# Patient Record
Sex: MALE | Race: WHITE | NOT HISPANIC OR LATINO | URBAN - METROPOLITAN AREA
[De-identification: names, ages, dates, MRNs, and addresses within clinical notes are randomized per-mention and may not be internally consistent; named-entity substitution may affect disease eponyms.]

---

## 2019-03-10 ENCOUNTER — INPATIENT (INPATIENT)
Facility: HOSPITAL | Age: 64
LOS: 1 days | Discharge: HOME | DRG: 287 | End: 2019-03-12
Attending: INTERNAL MEDICINE | Admitting: INTERNAL MEDICINE
Payer: COMMERCIAL

## 2019-03-10 VITALS
OXYGEN SATURATION: 100 % | RESPIRATION RATE: 18 BRPM | DIASTOLIC BLOOD PRESSURE: 73 MMHG | HEART RATE: 78 BPM | SYSTOLIC BLOOD PRESSURE: 119 MMHG | TEMPERATURE: 98 F

## 2019-03-10 LAB
ALBUMIN SERPL ELPH-MCNC: 3.4 G/DL — SIGNIFICANT CHANGE UP (ref 3.4–5)
ALP SERPL-CCNC: 62 U/L — SIGNIFICANT CHANGE UP (ref 40–120)
ALT FLD-CCNC: 26 U/L — SIGNIFICANT CHANGE UP (ref 12–42)
ANION GAP SERPL CALC-SCNC: 10 MMOL/L — SIGNIFICANT CHANGE UP (ref 9–16)
AST SERPL-CCNC: 15 U/L — SIGNIFICANT CHANGE UP (ref 15–37)
BILIRUB SERPL-MCNC: 0.3 MG/DL — SIGNIFICANT CHANGE UP (ref 0.2–1.2)
BUN SERPL-MCNC: 19 MG/DL — SIGNIFICANT CHANGE UP (ref 7–23)
CALCIUM SERPL-MCNC: 8.2 MG/DL — LOW (ref 8.5–10.5)
CHLORIDE SERPL-SCNC: 107 MMOL/L — SIGNIFICANT CHANGE UP (ref 96–108)
CO2 SERPL-SCNC: 26 MMOL/L — SIGNIFICANT CHANGE UP (ref 22–31)
CREAT SERPL-MCNC: 1.22 MG/DL — SIGNIFICANT CHANGE UP (ref 0.5–1.3)
ETHANOL SERPL-MCNC: 47 MG/DL — HIGH
GLUCOSE SERPL-MCNC: 113 MG/DL — HIGH (ref 70–99)
HCT VFR BLD CALC: 41.2 % — SIGNIFICANT CHANGE UP (ref 39–50)
HGB BLD-MCNC: 13.5 G/DL — SIGNIFICANT CHANGE UP (ref 13–17)
LACTATE SERPL-SCNC: 2.5 MMOL/L — HIGH (ref 0.4–2)
MCHC RBC-ENTMCNC: 29.2 PG — SIGNIFICANT CHANGE UP (ref 27–34)
MCHC RBC-ENTMCNC: 32.8 G/DL — SIGNIFICANT CHANGE UP (ref 32–36)
MCV RBC AUTO: 89.2 FL — SIGNIFICANT CHANGE UP (ref 80–100)
PCP SPEC-MCNC: SIGNIFICANT CHANGE UP
PLATELET # BLD AUTO: 189 K/UL — SIGNIFICANT CHANGE UP (ref 150–400)
POTASSIUM SERPL-MCNC: 3.7 MMOL/L — SIGNIFICANT CHANGE UP (ref 3.5–5.3)
POTASSIUM SERPL-SCNC: 3.7 MMOL/L — SIGNIFICANT CHANGE UP (ref 3.5–5.3)
PROT SERPL-MCNC: 6.2 G/DL — LOW (ref 6.4–8.2)
RBC # BLD: 4.62 M/UL — SIGNIFICANT CHANGE UP (ref 4.2–5.8)
RBC # FLD: 12.7 % — SIGNIFICANT CHANGE UP (ref 10.3–14.5)
SODIUM SERPL-SCNC: 143 MMOL/L — SIGNIFICANT CHANGE UP (ref 132–145)
TROPONIN I SERPL-MCNC: 0.03 NG/ML — SIGNIFICANT CHANGE UP (ref 0.02–0.06)
WBC # BLD: 5.7 K/UL — SIGNIFICANT CHANGE UP (ref 3.8–10.5)
WBC # FLD AUTO: 5.7 K/UL — SIGNIFICANT CHANGE UP (ref 3.8–10.5)

## 2019-03-10 PROCEDURE — 93010 ELECTROCARDIOGRAM REPORT: CPT

## 2019-03-10 PROCEDURE — 99285 EMERGENCY DEPT VISIT HI MDM: CPT | Mod: 25

## 2019-03-10 PROCEDURE — 99284 EMERGENCY DEPT VISIT MOD MDM: CPT

## 2019-03-10 RX ORDER — ASPIRIN/CALCIUM CARB/MAGNESIUM 324 MG
325 TABLET ORAL ONCE
Qty: 0 | Refills: 0 | Status: COMPLETED | OUTPATIENT
Start: 2019-03-10 | End: 2019-03-10

## 2019-03-10 RX ORDER — SODIUM CHLORIDE 9 MG/ML
1000 INJECTION INTRAMUSCULAR; INTRAVENOUS; SUBCUTANEOUS ONCE
Qty: 0 | Refills: 0 | Status: COMPLETED | OUTPATIENT
Start: 2019-03-10 | End: 2019-03-10

## 2019-03-10 RX ORDER — FAMOTIDINE 10 MG/ML
20 INJECTION INTRAVENOUS ONCE
Qty: 0 | Refills: 0 | Status: COMPLETED | OUTPATIENT
Start: 2019-03-10 | End: 2019-03-10

## 2019-03-10 RX ORDER — ONDANSETRON 8 MG/1
4 TABLET, FILM COATED ORAL ONCE
Qty: 0 | Refills: 0 | Status: COMPLETED | OUTPATIENT
Start: 2019-03-10 | End: 2019-03-10

## 2019-03-10 RX ADMIN — SODIUM CHLORIDE 2000 MILLILITER(S): 9 INJECTION INTRAMUSCULAR; INTRAVENOUS; SUBCUTANEOUS at 23:49

## 2019-03-10 RX ADMIN — Medication 325 MILLIGRAM(S): at 23:49

## 2019-03-10 RX ADMIN — FAMOTIDINE 20 MILLIGRAM(S): 10 INJECTION INTRAVENOUS at 23:49

## 2019-03-10 RX ADMIN — ONDANSETRON 4 MILLIGRAM(S): 8 TABLET, FILM COATED ORAL at 23:49

## 2019-03-10 NOTE — ED PROVIDER NOTE - ATTENDING CONTRIBUTION TO CARE
63 yom pw near syncope at dinner, states had drinks and marijuana.  reports nausea at the time.  hypotensive in field per EMS.  no sob/pleurisy/dyspnea.  last stress 1.5 yr ago per pt.  hx of HL, active smoker.  no ha, no neck pain.  per wife, similar sx 2 days ago, did not seek medical attention.    agree w/ PA, pt w/ risk factors but history not typical of acs, though initial ekg w/ t wave inversion in v4/5, will rpt labs and serial ekg.

## 2019-03-10 NOTE — ED PROVIDER NOTE - CARE PLAN
Principal Discharge DX:	Syncope Principal Discharge DX:	Syncope  Secondary Diagnosis:	Lactic acidosis

## 2019-03-10 NOTE — ED CDU PROVIDER INITIAL DAY NOTE - ATTENDING CONTRIBUTION TO CARE
pt placed on obs for telemetry monitoring and serial ekg/trop    noted 2nd ekg w/ progression of t wave inversion, will rpt again, pending trop

## 2019-03-10 NOTE — ED ADULT TRIAGE NOTE - CHIEF COMPLAINT QUOTE
patient was drinking and also took multiple "hits of marijuana" from vape pen when he started getting dizzy. patient was hypotensive in field. currently aaox3,

## 2019-03-10 NOTE — ED PROVIDER NOTE - CLINICAL SUMMARY MEDICAL DECISION MAKING FREE TEXT BOX
pt p/w near syncopal episode after dinner and marijuana use today, noted hypotensive in the field but normal vs upon arrival to the ED, EKG with TWI, no active CP, labs with elevated lactic at 2.5, trop 0.03, repeat EKG with no dynamic changes, will keep in obs for serial CE, tele monitoring, EKG, r/o ACS

## 2019-03-10 NOTE — ED CDU PROVIDER INITIAL DAY NOTE - OBJECTIVE STATEMENT
64 yo M with PMHx of HLD, depression, BIBA for dizziness and nausea s/p having alcohol and smoking marijuana tonight. Pt reports having dinner with family at an [place] restaurant, had few alcoholic drinks and multiple "hits of marijuana" from vape pen when he started feeling lightheaded with associated nausea around 10pm. Pt reports Sx got better after EMS arrival and IVF.  Noted to be hypotensive in the field with BP of 77/40s.  Now with residual nausea.  Denies fever, chills, palpitations, diaphoresis, SNOW, SOB, orthopnea, cough, hemoptysis, wheezing, peripheral edema, focal weakness, numbness, tingling, paresthesia, HA, LOC, neck pain, V/D/C, abdominal pain, change in urinary/bowel function, trauma, fall, rash, and malaise. Negative stress test 1.5 yr ago

## 2019-03-10 NOTE — ED PROVIDER NOTE - PHYSICAL EXAMINATION
Vital Signs - nursing notes reviewed and confirmed  Gen - WDWN M, NAD, comfortable and non-toxic appearing, speaking in full sentences   Skin - warm, dry, intact  HEENT - AT/NC, PERRL, EOMI, no conjunctival injection, moist oral mucosa, TM intact b/l with good cone of lights, o/p clear with no erythema, edema, or exudate, uvula midline, airway patent, neck supple and NT, FROM, no JVD or carotid bruits b/l, no palpable nodes  CV - S1S2, R/R/R  Resp - respiration non-labored, CTAB, symmetric bs b/l, no r/r/w  GI - NABS, soft, ND, NT, no rebound or guarding, no CVAT b/l   MS - w/w/p, no c/c/e, calves supple and NT, distal pulses symmetric b/l, brisk cap refills, +SILT  Neuro - AxOx3, no focal neuro deficits, CN II-XII grossly intact, cerebellar function intact, negative pronator drift, negative nystagmus, ambulatory without gait disturbance

## 2019-03-10 NOTE — ED ADULT NURSE NOTE - NSIMPLEMENTINTERV_GEN_ALL_ED
Implemented All Universal Safety Interventions:  [place] to call system. Call bell, personal items and telephone within reach. Instruct patient to call for assistance. Room bathroom lighting operational. Non-slip footwear when patient is off stretcher. Physically safe environment: no spills, clutter or unnecessary equipment. Stretcher in lowest position, wheels locked, appropriate side rails in place.

## 2019-03-10 NOTE — ED PROVIDER NOTE - OBJECTIVE STATEMENT
64 yo M with PMHx of 64 yo M with PMHx of HLD, depression, BIBA for dizziness and nausea s/p having alcohol and smoking marijuana tonight. Pt reports having dinner with family at an [place] restaurant, had few alcoholic drinks and multiple "hits of marijuana" from vape pen when he started feeling lightheaded with associated nausea around 10pm. Pt reports Sx got better after EMS arrival and IVF.  Noted to be hypotensive in the field with BP of 77/40s.  Now with residual nausea.  Denies fever, chills, palpitations, diaphoresis, SONW, SOB, orthopnea, cough, hemoptysis, wheezing, peripheral edema, focal weakness, numbness, tingling, paresthesia, HA, LOC, neck pain, V/D/C, abdominal pain, change in urinary/bowel function, trauma, fall, rash, and malaise. No prior stress test noted 64 yo M with PMHx of HLD, depression, BIBA for dizziness and nausea s/p having alcohol and smoking marijuana tonight. Pt reports having dinner with family at an [place] restaurant, had few alcoholic drinks and multiple "hits of marijuana" from vape pen when he started feeling lightheaded with associated nausea around 10pm. Pt reports Sx got better after EMS arrival and IVF.  Noted to be hypotensive in the field with BP of 77/40s.  Now with residual nausea.  Denies fever, chills, palpitations, diaphoresis, SNOW, SOB, orthopnea, cough, hemoptysis, wheezing, peripheral edema, focal weakness, numbness, tingling, paresthesia, HA, LOC, neck pain, V/D/C, abdominal pain, change in urinary/bowel function, trauma, fall, rash, and malaise. Negative stress test 1.5 yr ago

## 2019-03-10 NOTE — ED CDU PROVIDER INITIAL DAY NOTE - FAMILY HISTORY
Type 2 diabetes mellitus with hyperglycemia, with long-term current use of insulin No pertinent family history in first degree relatives

## 2019-03-10 NOTE — ED CDU PROVIDER INITIAL DAY NOTE - PROGRESS NOTE DETAILS
repeat EKG with no dynamic changes, pt remains CP free, asymptomatic and comfortable.  Will continue tele monitoring, IVF, and serial trops repeat EKG noted slight progression of TWI in V3-6, pt remains CP free, no SOB/palpitations/LOC, repeat trop stable, rec inpt ACS workup given HEART score of 5 and concerning EKG, but pt refused hospitalization and reports no active sx and feeling at baseline, risks and benefits fully discussed with pt and wife at bedside.  Will keep pt in obs for cardiology consult and CCTA in the am, pt amenable for current plan metoprolol given for rate optimization for CCTA. pt also on a statin at home. Will continue tele monitoring, medical management, pending cardiology eval in the am. wife - Olga Tex [phone]  private cardiologist @ [place] [facility]

## 2019-03-11 DIAGNOSIS — R63.8 OTHER SYMPTOMS AND SIGNS CONCERNING FOOD AND FLUID INTAKE: ICD-10-CM

## 2019-03-11 DIAGNOSIS — I10 ESSENTIAL (PRIMARY) HYPERTENSION: ICD-10-CM

## 2019-03-11 DIAGNOSIS — Z91.89 OTHER SPECIFIED PERSONAL RISK FACTORS, NOT ELSEWHERE CLASSIFIED: ICD-10-CM

## 2019-03-11 DIAGNOSIS — Z78.9 OTHER SPECIFIED HEALTH STATUS: ICD-10-CM

## 2019-03-11 DIAGNOSIS — E78.5 HYPERLIPIDEMIA, UNSPECIFIED: ICD-10-CM

## 2019-03-11 DIAGNOSIS — F32.9 MAJOR DEPRESSIVE DISORDER, SINGLE EPISODE, UNSPECIFIED: ICD-10-CM

## 2019-03-11 DIAGNOSIS — Z29.9 ENCOUNTER FOR PROPHYLACTIC MEASURES, UNSPECIFIED: ICD-10-CM

## 2019-03-11 DIAGNOSIS — I25.10 ATHEROSCLEROTIC HEART DISEASE OF NATIVE CORONARY ARTERY WITHOUT ANGINA PECTORIS: ICD-10-CM

## 2019-03-11 LAB
LACTATE SERPL-SCNC: 1.1 MMOL/L — SIGNIFICANT CHANGE UP (ref 0.4–2)
TROPONIN I SERPL-MCNC: 0.03 NG/ML — SIGNIFICANT CHANGE UP (ref 0.02–0.06)
TROPONIN I SERPL-MCNC: 0.04 NG/ML — SIGNIFICANT CHANGE UP (ref 0.02–0.06)

## 2019-03-11 PROCEDURE — 99222 1ST HOSP IP/OBS MODERATE 55: CPT

## 2019-03-11 PROCEDURE — 99236 HOSP IP/OBS SAME DATE HI 85: CPT

## 2019-03-11 PROCEDURE — 75574 CT ANGIO HRT W/3D IMAGE: CPT | Mod: 26

## 2019-03-11 RX ORDER — LANOLIN ALCOHOL/MO/W.PET/CERES
5 CREAM (GRAM) TOPICAL AT BEDTIME
Qty: 0 | Refills: 0 | Status: DISCONTINUED | OUTPATIENT
Start: 2019-03-11 | End: 2019-03-12

## 2019-03-11 RX ORDER — METOPROLOL TARTRATE 50 MG
25 TABLET ORAL DAILY
Qty: 0 | Refills: 0 | Status: DISCONTINUED | OUTPATIENT
Start: 2019-03-11 | End: 2019-03-12

## 2019-03-11 RX ORDER — ATORVASTATIN CALCIUM 80 MG/1
80 TABLET, FILM COATED ORAL AT BEDTIME
Qty: 0 | Refills: 0 | Status: DISCONTINUED | OUTPATIENT
Start: 2019-03-11 | End: 2019-03-12

## 2019-03-11 RX ORDER — HEPARIN SODIUM 5000 [USP'U]/ML
5000 INJECTION INTRAVENOUS; SUBCUTANEOUS EVERY 8 HOURS
Qty: 0 | Refills: 0 | Status: DISCONTINUED | OUTPATIENT
Start: 2019-03-11 | End: 2019-03-12

## 2019-03-11 RX ORDER — ASPIRIN/CALCIUM CARB/MAGNESIUM 324 MG
81 TABLET ORAL DAILY
Qty: 0 | Refills: 0 | Status: DISCONTINUED | OUTPATIENT
Start: 2019-03-11 | End: 2019-03-12

## 2019-03-11 RX ORDER — SERTRALINE 25 MG/1
150 TABLET, FILM COATED ORAL
Qty: 0 | Refills: 0 | COMMUNITY

## 2019-03-11 RX ORDER — LAMOTRIGINE 25 MG/1
150 TABLET, ORALLY DISINTEGRATING ORAL DAILY
Qty: 0 | Refills: 0 | Status: DISCONTINUED | OUTPATIENT
Start: 2019-03-11 | End: 2019-03-12

## 2019-03-11 RX ORDER — LAMOTRIGINE 25 MG/1
1 TABLET, ORALLY DISINTEGRATING ORAL
Qty: 0 | Refills: 0 | COMMUNITY

## 2019-03-11 RX ORDER — NITROGLYCERIN 6.5 MG
0.8 CAPSULE, EXTENDED RELEASE ORAL ONCE
Qty: 0 | Refills: 0 | Status: COMPLETED | OUTPATIENT
Start: 2019-03-11 | End: 2019-03-11

## 2019-03-11 RX ORDER — METOPROLOL TARTRATE 50 MG
50 TABLET ORAL ONCE
Qty: 0 | Refills: 0 | Status: COMPLETED | OUTPATIENT
Start: 2019-03-11 | End: 2019-03-11

## 2019-03-11 RX ORDER — AMLODIPINE BESYLATE AND BENAZEPRIL HYDROCHLORIDE 10; 20 MG/1; MG/1
1 CAPSULE ORAL
Qty: 0 | Refills: 0 | COMMUNITY

## 2019-03-11 RX ORDER — ATORVASTATIN CALCIUM 80 MG/1
80 TABLET, FILM COATED ORAL ONCE
Qty: 0 | Refills: 0 | Status: COMPLETED | OUTPATIENT
Start: 2019-03-11 | End: 2019-03-11

## 2019-03-11 RX ORDER — METOPROLOL TARTRATE 50 MG
25 TABLET ORAL ONCE
Qty: 0 | Refills: 0 | Status: COMPLETED | OUTPATIENT
Start: 2019-03-11 | End: 2019-03-11

## 2019-03-11 RX ORDER — SERTRALINE 25 MG/1
150 TABLET, FILM COATED ORAL DAILY
Qty: 0 | Refills: 0 | Status: DISCONTINUED | OUTPATIENT
Start: 2019-03-11 | End: 2019-03-12

## 2019-03-11 RX ORDER — TRAZODONE HCL 50 MG
1 TABLET ORAL
Qty: 0 | Refills: 0 | COMMUNITY

## 2019-03-11 RX ORDER — AMLODIPINE BESYLATE 2.5 MG/1
2.5 TABLET ORAL DAILY
Qty: 0 | Refills: 0 | Status: DISCONTINUED | OUTPATIENT
Start: 2019-03-11 | End: 2019-03-12

## 2019-03-11 RX ORDER — THIAMINE MONONITRATE (VIT B1) 100 MG
100 TABLET ORAL DAILY
Qty: 0 | Refills: 0 | Status: DISCONTINUED | OUTPATIENT
Start: 2019-03-11 | End: 2019-03-12

## 2019-03-11 RX ORDER — SODIUM CHLORIDE 9 MG/ML
1000 INJECTION, SOLUTION INTRAVENOUS
Qty: 0 | Refills: 0 | Status: DISCONTINUED | OUTPATIENT
Start: 2019-03-11 | End: 2019-03-11

## 2019-03-11 RX ORDER — SODIUM CHLORIDE 9 MG/ML
1000 INJECTION INTRAMUSCULAR; INTRAVENOUS; SUBCUTANEOUS
Qty: 0 | Refills: 0 | Status: DISCONTINUED | OUTPATIENT
Start: 2019-03-12 | End: 2019-03-12

## 2019-03-11 RX ORDER — TRAZODONE HCL 50 MG
50 TABLET ORAL AT BEDTIME
Qty: 0 | Refills: 0 | Status: DISCONTINUED | OUTPATIENT
Start: 2019-03-11 | End: 2019-03-12

## 2019-03-11 RX ORDER — FOLIC ACID 0.8 MG
1 TABLET ORAL DAILY
Qty: 0 | Refills: 0 | Status: DISCONTINUED | OUTPATIENT
Start: 2019-03-11 | End: 2019-03-12

## 2019-03-11 RX ORDER — ASPIRIN/CALCIUM CARB/MAGNESIUM 324 MG
1 TABLET ORAL
Qty: 0 | Refills: 0 | COMMUNITY

## 2019-03-11 RX ORDER — CLOPIDOGREL BISULFATE 75 MG/1
75 TABLET, FILM COATED ORAL DAILY
Qty: 0 | Refills: 0 | Status: DISCONTINUED | OUTPATIENT
Start: 2019-03-11 | End: 2019-03-12

## 2019-03-11 RX ORDER — SERTRALINE 25 MG/1
1.5 TABLET, FILM COATED ORAL
Qty: 0 | Refills: 0 | COMMUNITY

## 2019-03-11 RX ORDER — METOPROLOL TARTRATE 50 MG
25 TABLET ORAL
Qty: 0 | Refills: 0 | Status: DISCONTINUED | OUTPATIENT
Start: 2019-03-11 | End: 2019-03-11

## 2019-03-11 RX ADMIN — SODIUM CHLORIDE 1000 MILLILITER(S): 9 INJECTION, SOLUTION INTRAVENOUS at 12:42

## 2019-03-11 RX ADMIN — Medication 50 MILLIGRAM(S): at 04:29

## 2019-03-11 RX ADMIN — Medication 0.8 MILLIGRAM(S): at 09:05

## 2019-03-11 RX ADMIN — ATORVASTATIN CALCIUM 80 MILLIGRAM(S): 80 TABLET, FILM COATED ORAL at 21:31

## 2019-03-11 RX ADMIN — ATORVASTATIN CALCIUM 80 MILLIGRAM(S): 80 TABLET, FILM COATED ORAL at 04:29

## 2019-03-11 RX ADMIN — Medication 50 MILLIGRAM(S): at 21:31

## 2019-03-11 RX ADMIN — SODIUM CHLORIDE 1000 MILLILITER(S): 9 INJECTION, SOLUTION INTRAVENOUS at 13:07

## 2019-03-11 RX ADMIN — CLOPIDOGREL BISULFATE 75 MILLIGRAM(S): 75 TABLET, FILM COATED ORAL at 18:56

## 2019-03-11 RX ADMIN — Medication 25 MILLIGRAM(S): at 09:08

## 2019-03-11 RX ADMIN — SODIUM CHLORIDE 1000 MILLILITER(S): 9 INJECTION, SOLUTION INTRAVENOUS at 13:30

## 2019-03-11 RX ADMIN — HEPARIN SODIUM 5000 UNIT(S): 5000 INJECTION INTRAVENOUS; SUBCUTANEOUS at 21:32

## 2019-03-11 RX ADMIN — Medication 5 MILLIGRAM(S): at 23:14

## 2019-03-11 RX ADMIN — SODIUM CHLORIDE 1000 MILLILITER(S): 9 INJECTION INTRAMUSCULAR; INTRAVENOUS; SUBCUTANEOUS at 00:35

## 2019-03-11 NOTE — ED CDU PROVIDER DISPOSITION NOTE - CLINICAL COURSE
lightheadedness/nausea- T wave inversions consistently in V3-V5. serial trops negative. CTA Coronaries shows calcium score of 814, and moderate stenosis in proximal LAD. Seen in ED by Dr. Ramos. To be admitted for likely Cath. Accepted via transfer center by Dr. Pedro for CCU

## 2019-03-11 NOTE — PATIENT PROFILE ADULT - NSSTREETDRUGFR_GEN_A_NUR
Pt states he smoked marijuana daily for nearly 40 years - and has stopped daily smoking about three years ago./daily

## 2019-03-11 NOTE — ED CDU PROVIDER DISPOSITION NOTE - NS ED ATTENDING STATEMENT MOD
family
I have personally performed a face to face diagnostic evaluation on this patient. I have reviewed the ACP note and agree with the history, exam and plan of care, except as noted.

## 2019-03-11 NOTE — H&P ADULT - NSHPSOCIALHISTORY_GEN_ALL_CORE
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

## 2019-03-11 NOTE — PATIENT PROFILE ADULT - NSPROMEDSDISPOSITION_GEN_A_NUR
sent home w/ family/friend/Pt told to not take any of his own meds-only those given to luis miguel by RN. Pt verbalized understanding.

## 2019-03-11 NOTE — H&P ADULT - NSHPREVIEWOFSYSTEMS_GEN_ALL_CORE
General:	no fever, no chills, no fatigue  ENMT: no tinnitus, no nasal discharge, no abnormal taste sensation  Respiratory and Thorax: no wheezing, no dyspnea, no cough  Cardiovascular: no chest pain, no palpitations, no SNOW  Gastrointestinal: no nausea, no vomiting, no diarrhea, no constipation, no change in bowel habits  Genitourinary: no hematuria, no urine discoloration  Musculoskeletal: no arthralgia, no myalgia, no muscle cramps  Neurological: no weakness, no headache, no loss of consciousness  Hematology/Lymphatics: no gum bleeding, no nose bleeding  Endocrine: no enlarged lymph nodes  Allergic/Immunologic: no cold intolerance, no heat intolerance

## 2019-03-11 NOTE — H&P ADULT - PROBLEM SELECTOR PLAN 5
Patient w/ a hx of Depression/anxiety, on Zoloft 150mg, Lamictal 150mg and Trazodone 50mg at home  -c/w home meds

## 2019-03-11 NOTE — H&P ADULT - NSHPLABSRESULTS_GEN_ALL_CORE
LABS:                        13.5   5.7   )-----------( 189      ( 10 Mar 2019 23:19 )             41.2     03-10    143  |  107  |  19  ----------------------------<  113<H>  3.7   |  26  |  1.22    Ca    8.2<L>      10 Mar 2019 23:19    TPro  6.2<L>  /  Alb  3.4  /  TBili  0.3  /  DBili  x   /  AST  15  /  ALT  26  /  AlkPhos  62  03-10    POCT Blood Glucose.: 96 mg/dL (10 Mar 2019 23:15)      RADIOLOGY & ADDITIONAL TESTS: Reviewed.

## 2019-03-11 NOTE — H&P ADULT - PROBLEM SELECTOR PLAN 3
Patient with a hx of HTN, on home Norvasc-Benazepril 2.5-10mg  -c/w Norvasc 2.5mg daily  -holding Benazepril 10mg in the setting of cardiac cath tomorrow  -c/w Toprol 25mg qd Patient with a hx of HTN, on home Norvasc-Benazepril 2.5-10mg  -c/w Norvasc 2.5mg daily  -holding Benazepril 10mg in the setting of cardiac cath tomorrow  -c/w Toprol 25mg qd    #Transient Hypotension  Patient with an episode of HypoTN in the field (BP 77/40), received 1L NS bolus. Currently normotensive  -c/w meds as above  -hold for SBP <90, HR <60

## 2019-03-11 NOTE — ED CDU PROVIDER DISPOSITION NOTE - ATTENDING CONTRIBUTION TO CARE
patient placed on obs for EKG changes and serial trops  and CTCA in am. Had near syncop.e and progressive EKG changes. Seen by cards. VSS, NAD. CTCA shows moderate disease. Admitted to Cards at [facility].

## 2019-03-11 NOTE — H&P ADULT - NSHPPHYSICALEXAM_GEN_ALL_CORE
VITAL SIGNS:  Vital Signs Last 24 Hrs  T(C): 36.1 (11 Mar 2019 16:21), Max: 36.9 (11 Mar 2019 05:57)  T(F): 97 (11 Mar 2019 16:21), Max: 98.5 (11 Mar 2019 05:57)  HR: 62 (11 Mar 2019 16:21) (56 - 79)  BP: 133/73 (11 Mar 2019 16:21) (99/55 - 135/83)  BP(mean): 93 (11 Mar 2019 16:21) (93 - 93)  RR: 17 (11 Mar 2019 16:21) (16 - 18)  SpO2: 99% (11 Mar 2019 16:21) (95% - 100%)    PHYSICAL EXAM:  General: pleasant male, in NAD, lying comfortably in bed  HEENT: normocephalic, atraumatic; PERRL, anicteric sclera; MMM; no tongue fasciculations  Neck: supple, no JVD, no thyromegaly, no lymphadenopathy  Cardiovascular: +S1/S2, RRR, no M/G/R  Respiratory: clear to auscultation B/L; no wheezing, no rales, no rhonchi  Gastrointestinal: soft, NT/ND; +BSx4, no organomegaly  Extremities: WWP; no edema, clubbing or cyanosis; no hand tremors  Vascular: 2+ radial, DP/PT pulses B/L  Neurological: AAOx3; strength 5/5 b/l UE and LEs; no focal deficits  Skin: warm and dry    CIWA: 0

## 2019-03-11 NOTE — H&P ADULT - ATTENDING COMMENTS
See resident note written above, for details. I reviewed the resident documentation.  I reviewed vitals, labs, medications, cardiac studies and additional imaging 3/11/19.  I agree with the resident's findings and plans as written above with the following additions/amendments:    63 WM with HLD, Depression, EtOH abuse presents as transfer from [facility] for evaluation of abnormal EKG. CCTA with mdoerate LAD stenosis.  Patient transferred to [facility] for further management and [facility] for ischemic evaluation. Patient HDS, asx, and eager to be discharged.  Plan for:  ASA, statin, BB  Order HbA1c, TFTs, FLP  [facility] with Dr. Chavira for ischemic evaluation  NPO for [facility]  ZOHREH Bravo.  Cardiology Attending

## 2019-03-11 NOTE — PATIENT PROFILE ADULT - VISION (WITH CORRECTIVE LENSES IF THE PATIENT USUALLY WEARS THEM):
Wears eyeglasses for distance./Partially impaired: cannot see medication labels or newsprint, but can see obstacles in path, and the surrounding layout; can count fingers at arm's length

## 2019-03-11 NOTE — H&P ADULT - PROBLEM SELECTOR PLAN 2
Patient reports drinking a couple of drinks on 3/10 evening. BAL of 47. CIWA 0 on admission  -CIWA monitoring  -Folic acid, thiamine, MV  -Ativan 2mg IV PRN Patient reports drinking a couple of drinks on 3/10 evening. BAL of 47. CIWA 0 on admission  -CIWA monitoring  -Folic acid, thiamine, MV  -Ativan 2mg IV PRN    #Syncope  Patient with lightheadedness and nausea, associated with transient unresponsiveness yesterday (daughter describes as  diaphoresis and battling his eyes for about 1 minute). Patient denies any chest pain, palpitations. Orthostatics negative. Patient with CTH. Syncope could be in the setting of CAD vs seizure given presenting symptoms  -f/u orthostatics  -will consider seizure w/up after CAD w/up  -management as above Patient reports drinking a couple of drinks on 3/10 evening. BAL of 47. CIWA 0 on admission  -CIWA monitoring  -Folic acid, thiamine, MV  -Ativan 2mg IV PRN    #Syncope  Patient with lightheadedness and nausea, associated with transient unresponsiveness yesterday (daughter describes as  diaphoresis and battling his eyes for about 1 minute). Patient denies any chest pain, palpitations. Orthostatics negative. Patient with CTH. Syncope could be in the setting of CAD vs seizure given presenting symptoms  -f/u orthostatics  -f/u ECHO  -will consider seizure w/up after CAD w/up  -management as above

## 2019-03-11 NOTE — CONSULT NOTE ADULT - SUBJECTIVE AND OBJECTIVE BOX
[facility] Cardiology Consultation    CHIEF COMPLAINT: dizziness    HISTORY OF PRESENT ILLNESS: 62 y/o male with history of HTN presented after an episode of dizziness and transient unresponsiveness last night. The patient saw a play with his daughter and became unresponsive shortly after sharing marijuana with her after. He has not used the substance in some time. It seems that shortly after, his b/p dropped and he became unresponsive briefly. He currently feels better.     PAST MEDICAL & SURGICAL HISTORY:  Other depression  Anxiety  HLD (hyperlipidemia)  No significant past surgical history        Allergies NKDA        MEDICATIONS:  sertraline  amlodipine    FAMILY HISTORY:  No pertinent family history in first degree relatives    SOCIAL HISTORY:  no EtOH, tobacco or drug use    REVIEW OF SYSTEMS:  CONSTITUTIONAL: No fever, weight loss, or fatigue  EYES: No eye pain, visual disturbances, or discharge  ENMT:  No difficulty hearing, tinnitus, vertigo; No sinus or throat pain  NECK: No pain or stiffness  BREASTS: No pain, masses, or nipple discharge  RESPIRATORY: No cough, wheezing, chills or hemoptysis; No Shortness of Breath  CARDIOVASCULAR: No chest pain, palpitations, dizziness, or leg swelling  GASTROINTESTINAL: No abdominal or epigastric pain. No nausea, vomiting, or hematemesis; No diarrhea or constipation. No melena or hematochezia.  GENITOURINARY: No dysuria, frequency, hematuria, or incontinence  NEUROLOGICAL: No headaches, memory loss, loss of strength, numbness, or tremors  SKIN: No itching, burning, rashes, or lesions   LYMPH Nodes: No enlarged glands  ENDOCRINE: No heat or cold intolerance; No hair loss  MUSCULOSKELETAL: No joint pain or swelling; No muscle, back, or extremity pain  PSYCHIATRIC: No depression, anxiety, mood swings, or difficulty sleeping  HEME/LYMPH: No easy bruising, or bleeding gums  ALLERY AND IMMUNOLOGIC: No hives or eczema	      PHYSICAL EXAM:  T(C): 36.9 (03-11-19 @ 09:56), Max: 36.9 (03-11-19 @ 05:57)  HR: 58 (03-11-19 @ 09:56) (58 - 79)  BP: 109/63 (03-11-19 @ 09:56) (99/55 - 119/73)  RR: 16 (03-11-19 @ 09:56) (16 - 18)  SpO2: 97% (03-11-19 @ 09:56) (95% - 100%)    Appearance: middle aged man NAD   HEENT:   Normal oral mucosa, PERRL, EOMI	  Lymphatic: No lymphadenopathy  Cardiovascular: Normal S1 S2, No JVD, No murmurs, No edema  Respiratory: Lungs clear to auscultation	  Psychiatry: A & O x 3, Mood & affect appropriate  Gastrointestinal:  Soft, Non-tender, + BS	  Skin: No rashes, No ecchymoses, No cyanosis	  Neurologic: Non-focal  Extremities: Normal range of motion, No clubbing, cyanosis or edema  Vascular: Peripheral pulses palpable 2+ bilaterally  	    ECG:  	st with t wave abnormalities c/w ischemia    LABS:	 	  CARDIAC MARKERS:  Troponin I, Serum: 0.041 ng/mL (03-11 @ 07:40)  Troponin I, Serum: 0.034 ng/mL (03-11 @ 03:26)  Troponin I, Serum: 0.031 ng/mL (03-10 @ 23:20)                                  13.5   5.7   )-----------( 189      ( 10 Mar 2019 23:19 )             41.2     03-10    143  |  107  |  19  ----------------------------<  113<H>  3.7   |  26  |  1.22    Ca    8.2<L>      10 Mar 2019 23:19    TPro  6.2<L>  /  Alb  3.4  /  TBili  0.3  /  DBili  x   /  AST  15  /  ALT  26  /  AlkPhos  62  03-10        ASSESSMENT/PLAN: 	62 y/o male with dizziness and abnormal ekg  1. patient seen and examined, chart reviewed  2. ekg findings noted  3. limited bedside echo done with preserved LV systolic function, mild MR, but otherwise unremarkable  4. recommend metoprolol 25 mg PO x 1  5. patient is for cardiac ccta this am    I spent 45 min in care of this patient

## 2019-03-11 NOTE — H&P ADULT - PROBLEM SELECTOR PLAN 1
Patient presenting to [facility] s/p syncope at home, preceded by lightheadedness and nausea in the setting of EtOH and marijuana. Patient denies LOC, head trauma, reports similar symptoms about 2 days ago, and negative stress test 1.5yrs ago. EKG with TWI in V4-V5, repeat EKG with progression of TWI in V1-V6. Troponins –ve x 3. Patient with CCTA showing moderate pLAD stenosis, dense calcific plaque of D1  -c/w ASA 81mg qd  -c/w Plavix 75mg qd  -c/w Lipitor 80mg  -c/w Toprol 25mg qd  -f/u EKG  -f/a A1c, TSH, lipid panel  -pt for cardiac cath tomorrow, NPO after midnight  -active T&S Patient presenting to [facility] s/p syncope at home, preceded by lightheadedness and nausea in the setting of EtOH and marijuana. Patient denies LOC, head trauma, reports similar symptoms about 2 days ago, and negative stress test 1.5yrs ago. EKG with TWI in V4-V5, repeat EKG with progression of TWI in V1-V6. Troponins –ve x 3. Patient with CCTA showing moderate pLAD stenosis, dense calcific plaque of D1  -c/w ASA 81mg qd  -c/w Plavix 75mg qd  -c/w Lipitor 80mg  -c/w Toprol 25mg qd  -f/u EKG  -f/a A1c, TSH, lipid panel  -pt for cardiac cath tomorrow, NPO after midnight  -active T&S    #Elevated lactate  Resolved. Patient presenting with lactate 2.5, likely in the setting of hypoperfusion given CAD vs ?syncope  -lactate cleared: 2.5-->1.1  -continue to monitor Patient presenting to [facility] s/p syncope at home, preceded by lightheadedness and nausea in the setting of EtOH and marijuana. Patient denies LOC, head trauma, reports similar symptoms about 2 days ago, and negative stress test 1.5yrs ago. EKG with TWI in V4-V5, repeat EKG with progression of TWI in V1-V6. Troponins –ve x 3. Patient with CCTA showing moderate pLAD stenosis, dense calcific plaque of D1  -c/w ASA 81mg qd  -c/w Plavix 75mg qd  -c/w Lipitor 80mg  -c/w Toprol 25mg qd  -f/u EKG  -f/u ECHO  -f/a A1c, TSH, lipid panel  -pt for cardiac cath tomorrow, NPO after midnight  -active T&S    #Elevated lactate  Resolved. Patient presenting with lactate 2.5, likely in the setting of hypoperfusion given CAD vs ?syncope  -lactate cleared: 2.5-->1.1  -continue to monitor

## 2019-03-11 NOTE — H&P ADULT - ASSESSMENT
62 yo M with a PMH of HLD, depression, EtOH use (drinks about 2-3 doubles of vodka x 10 yrs, no hx of withdrawals, no seizures) presented to [facility] on 3/10 with dizziness and nausea s/p drinking EtOH and using marijuana. EKG c/w TWI in V4, V5. Repeat EKG with progression of TWI in V3-V6. CCTA showed moderate pLAD stenosis, dense calcific plaque of D1, calcium score 814, EF 67% with normal wall motion. Patient transferred to [facility] for further management

## 2019-03-11 NOTE — H&P ADULT - HISTORY OF PRESENT ILLNESS
Patient is a 62 yo M with a PMH of HLD, depression, EtOH use (drinks about 2-3 doubles of vodka x 10 yrs, no hx of withdrawals, no seizures) presented to [facility] on 3/10 with dizziness and nausea s/p drinking EtOH and using marijuana. As per patient, he had dinner with family at an [employer] restaurant, with a few EtOH drinks, and multiple hits of marijuana, he then started feeling lightheaded associated with nausea, and ?syncope. According to the daughter, patient had an episode of unresponsiveness, with diaphoresis and battling his eyes for about 1 minute, then he put his head on the table, appeared drowsy upon gaining consciousness. Patient however does not recall this episode. Denies headache, lightheadedness, dizziness, head trauma, chest pain, and palpitations. Of note, patient with similar symptoms about 2 days ago, however didn’t seek medical care; he had a negative stress test 1.5yrs ago.   He was noted to be hypotensive in the field (BP 77/40). Upon ED arrival, /73, HR 78, RR 18, SpO2 100% on RA, T 97.8.  Labs s/f lactate 2.5, Trop I –ve x 3. BAL 47, Utox -ve. EKG c/w TWI in V4, V5. Repeat EKG with progression of TWI in V3-V6, patient given Lopressor 25mg x 1 and NTG 0.5mg for CCTA. CCTA showed moderate pLAD stenosis, dense calcific plaque of D1, calcium score 814, EF 67% with normal wall motion. He was given 1L NS bolus, 1L D5 ½ NS bolus, Patient transferred to [facility] for further management. Patient is a 62 yo M with a PMH of HLD, depression, EtOH use (drinks about 2-3 doubles of vodka x 10 yrs, no hx of withdrawals, no seizures) presented to [facility] on 3/10 with dizziness and nausea s/p drinking EtOH and using marijuana. As per patient, he had dinner with family at an [employer] restaurant, with a few EtOH drinks, and multiple hits of marijuana, he then started feeling lightheaded associated with nausea, and ?syncope. According to the daughter, patient had an episode of unresponsiveness, with diaphoresis and battling his eyes for about 1 minute, then he put his head on the table, appeared drowsy upon gaining consciousness. Patient however does not recall this episode. Denies headache, lightheadedness, dizziness, head trauma, chest pain, and palpitations. Of note, patient with similar symptoms about 2 days ago, however didn’t seek medical care; he had a negative stress test 1.5yrs ago.   He was noted to be hypotensive in the field (BP 77/40, received 1L NS bolus). Upon ED arrival, /73, HR 78, RR 18, SpO2 100% on RA, T 97.8.  Labs s/f lactate 2.5, Trop I –ve x 3. BAL 47, Utox -ve. EKG c/w TWI in V4, V5. Repeat EKG with progression of TWI in V3-V6, patient given Lopressor 25mg x 1 and NTG 0.5mg for CCTA. CCTA showed moderate pLAD stenosis, dense calcific plaque of D1, calcium score 814, EF 67% with normal wall motion. He was given 1L NS bolus, 1L D5 ½ NS bolus, Patient transferred to [facility] for further management.

## 2019-03-11 NOTE — PATIENT PROFILE ADULT - STATED REASON FOR ADMISSION
"I nearly collapsed after dinner with my daughter after smoking marijuana and a couple of drinks-I was nauseous, sweating profusely and dizzy."

## 2019-03-12 ENCOUNTER — TRANSCRIPTION ENCOUNTER (OUTPATIENT)
Age: 64
End: 2019-03-12

## 2019-03-12 VITALS — TEMPERATURE: 97 F

## 2019-03-12 DIAGNOSIS — R55 SYNCOPE AND COLLAPSE: ICD-10-CM

## 2019-03-12 PROBLEM — F41.9 ANXIETY DISORDER, UNSPECIFIED: Chronic | Status: ACTIVE | Noted: 2019-03-10

## 2019-03-12 PROBLEM — F32.89 OTHER SPECIFIED DEPRESSIVE EPISODES: Chronic | Status: ACTIVE | Noted: 2019-03-10

## 2019-03-12 PROBLEM — Z00.00 ENCOUNTER FOR PREVENTIVE HEALTH EXAMINATION: Status: ACTIVE | Noted: 2019-03-12

## 2019-03-12 PROBLEM — E78.5 HYPERLIPIDEMIA, UNSPECIFIED: Chronic | Status: ACTIVE | Noted: 2019-03-10

## 2019-03-12 LAB
ALBUMIN SERPL ELPH-MCNC: 3.7 G/DL — SIGNIFICANT CHANGE UP (ref 3.3–5)
ALP SERPL-CCNC: 52 U/L — SIGNIFICANT CHANGE UP (ref 40–120)
ALT FLD-CCNC: 14 U/L — SIGNIFICANT CHANGE UP (ref 10–45)
ANION GAP SERPL CALC-SCNC: 9 MMOL/L — SIGNIFICANT CHANGE UP (ref 5–17)
APTT BLD: 27 SEC — LOW (ref 27.5–36.3)
AST SERPL-CCNC: 12 U/L — SIGNIFICANT CHANGE UP (ref 10–40)
BILIRUB SERPL-MCNC: 0.4 MG/DL — SIGNIFICANT CHANGE UP (ref 0.2–1.2)
BLD GP AB SCN SERPL QL: NEGATIVE — SIGNIFICANT CHANGE UP
BUN SERPL-MCNC: 16 MG/DL — SIGNIFICANT CHANGE UP (ref 7–23)
CALCIUM SERPL-MCNC: 9.1 MG/DL — SIGNIFICANT CHANGE UP (ref 8.4–10.5)
CHLORIDE SERPL-SCNC: 106 MMOL/L — SIGNIFICANT CHANGE UP (ref 96–108)
CHOLEST SERPL-MCNC: 115 MG/DL — SIGNIFICANT CHANGE UP (ref 10–199)
CO2 SERPL-SCNC: 26 MMOL/L — SIGNIFICANT CHANGE UP (ref 22–31)
CREAT SERPL-MCNC: 1.08 MG/DL — SIGNIFICANT CHANGE UP (ref 0.5–1.3)
GLUCOSE SERPL-MCNC: 99 MG/DL — SIGNIFICANT CHANGE UP (ref 70–99)
HBA1C BLD-MCNC: 5.4 % — SIGNIFICANT CHANGE UP (ref 4–5.6)
HCT VFR BLD CALC: 41.6 % — SIGNIFICANT CHANGE UP (ref 39–50)
HCV AB S/CO SERPL IA: 0.03 S/CO — SIGNIFICANT CHANGE UP
HCV AB SERPL-IMP: SIGNIFICANT CHANGE UP
HDLC SERPL-MCNC: 54 MG/DL — SIGNIFICANT CHANGE UP
HGB BLD-MCNC: 13.3 G/DL — SIGNIFICANT CHANGE UP (ref 13–17)
INR BLD: 1.05 — SIGNIFICANT CHANGE UP (ref 0.88–1.16)
LIPID PNL WITH DIRECT LDL SERPL: 41 MG/DL — SIGNIFICANT CHANGE UP
MAGNESIUM SERPL-MCNC: 2.1 MG/DL — SIGNIFICANT CHANGE UP (ref 1.6–2.6)
MCHC RBC-ENTMCNC: 29.1 PG — SIGNIFICANT CHANGE UP (ref 27–34)
MCHC RBC-ENTMCNC: 32 GM/DL — SIGNIFICANT CHANGE UP (ref 32–36)
MCV RBC AUTO: 91 FL — SIGNIFICANT CHANGE UP (ref 80–100)
NRBC # BLD: 0 /100 WBCS — SIGNIFICANT CHANGE UP (ref 0–0)
PLATELET # BLD AUTO: 175 K/UL — SIGNIFICANT CHANGE UP (ref 150–400)
POTASSIUM SERPL-MCNC: 3.9 MMOL/L — SIGNIFICANT CHANGE UP (ref 3.5–5.3)
POTASSIUM SERPL-SCNC: 3.9 MMOL/L — SIGNIFICANT CHANGE UP (ref 3.5–5.3)
PROT SERPL-MCNC: 5.7 G/DL — LOW (ref 6–8.3)
PROTHROM AB SERPL-ACNC: 11.9 SEC — SIGNIFICANT CHANGE UP (ref 10–12.9)
RBC # BLD: 4.57 M/UL — SIGNIFICANT CHANGE UP (ref 4.2–5.8)
RBC # FLD: 12.7 % — SIGNIFICANT CHANGE UP (ref 10.3–14.5)
RH IG SCN BLD-IMP: POSITIVE — SIGNIFICANT CHANGE UP
SODIUM SERPL-SCNC: 141 MMOL/L — SIGNIFICANT CHANGE UP (ref 135–145)
TOTAL CHOLESTEROL/HDL RATIO MEASUREMENT: 2.1 RATIO — LOW (ref 3.4–9.6)
TRIGL SERPL-MCNC: 101 MG/DL — SIGNIFICANT CHANGE UP (ref 10–149)
TSH SERPL-MCNC: 1.99 UIU/ML — SIGNIFICANT CHANGE UP (ref 0.35–4.94)
WBC # BLD: 6.9 K/UL — SIGNIFICANT CHANGE UP (ref 3.8–10.5)
WBC # FLD AUTO: 6.9 K/UL — SIGNIFICANT CHANGE UP (ref 3.8–10.5)

## 2019-03-12 PROCEDURE — 84443 ASSAY THYROID STIM HORMONE: CPT

## 2019-03-12 PROCEDURE — 93458 L HRT ARTERY/VENTRICLE ANGIO: CPT | Mod: 26

## 2019-03-12 PROCEDURE — C8929: CPT

## 2019-03-12 PROCEDURE — 96374 THER/PROPH/DIAG INJ IV PUSH: CPT | Mod: XU

## 2019-03-12 PROCEDURE — 93010 ELECTROCARDIOGRAM REPORT: CPT

## 2019-03-12 PROCEDURE — 96375 TX/PRO/DX INJ NEW DRUG ADDON: CPT | Mod: XU

## 2019-03-12 PROCEDURE — 85730 THROMBOPLASTIN TIME PARTIAL: CPT

## 2019-03-12 PROCEDURE — C1887: CPT

## 2019-03-12 PROCEDURE — 99285 EMERGENCY DEPT VISIT HI MDM: CPT | Mod: 25

## 2019-03-12 PROCEDURE — 86850 RBC ANTIBODY SCREEN: CPT

## 2019-03-12 PROCEDURE — 93306 TTE W/DOPPLER COMPLETE: CPT | Mod: 26

## 2019-03-12 PROCEDURE — 82962 GLUCOSE BLOOD TEST: CPT

## 2019-03-12 PROCEDURE — 83735 ASSAY OF MAGNESIUM: CPT

## 2019-03-12 PROCEDURE — C1894: CPT

## 2019-03-12 PROCEDURE — 83036 HEMOGLOBIN GLYCOSYLATED A1C: CPT

## 2019-03-12 PROCEDURE — 75574 CT ANGIO HRT W/3D IMAGE: CPT

## 2019-03-12 PROCEDURE — 86900 BLOOD TYPING SEROLOGIC ABO: CPT

## 2019-03-12 PROCEDURE — 99239 HOSP IP/OBS DSCHRG MGMT >30: CPT

## 2019-03-12 PROCEDURE — 93005 ELECTROCARDIOGRAM TRACING: CPT

## 2019-03-12 PROCEDURE — C1760: CPT

## 2019-03-12 PROCEDURE — G0378: CPT

## 2019-03-12 PROCEDURE — 36415 COLL VENOUS BLD VENIPUNCTURE: CPT

## 2019-03-12 PROCEDURE — 96361 HYDRATE IV INFUSION ADD-ON: CPT | Mod: XU

## 2019-03-12 PROCEDURE — 86901 BLOOD TYPING SEROLOGIC RH(D): CPT

## 2019-03-12 PROCEDURE — 85027 COMPLETE CBC AUTOMATED: CPT

## 2019-03-12 PROCEDURE — 80061 LIPID PANEL: CPT

## 2019-03-12 PROCEDURE — 85610 PROTHROMBIN TIME: CPT

## 2019-03-12 PROCEDURE — 86803 HEPATITIS C AB TEST: CPT

## 2019-03-12 PROCEDURE — 83605 ASSAY OF LACTIC ACID: CPT

## 2019-03-12 PROCEDURE — C1889: CPT

## 2019-03-12 PROCEDURE — 80307 DRUG TEST PRSMV CHEM ANLYZR: CPT

## 2019-03-12 PROCEDURE — 80053 COMPREHEN METABOLIC PANEL: CPT

## 2019-03-12 PROCEDURE — 84484 ASSAY OF TROPONIN QUANT: CPT

## 2019-03-12 PROCEDURE — C1769: CPT

## 2019-03-12 RX ORDER — METOPROLOL TARTRATE 50 MG
1 TABLET ORAL
Qty: 30 | Refills: 0 | OUTPATIENT
Start: 2019-03-12 | End: 2019-04-10

## 2019-03-12 RX ORDER — ASPIRIN/CALCIUM CARB/MAGNESIUM 324 MG
243 TABLET ORAL ONCE
Qty: 0 | Refills: 0 | Status: COMPLETED | OUTPATIENT
Start: 2019-03-12 | End: 2019-03-12

## 2019-03-12 RX ORDER — SIMVASTATIN 20 MG/1
1 TABLET, FILM COATED ORAL
Qty: 0 | Refills: 0 | COMMUNITY

## 2019-03-12 RX ORDER — ATORVASTATIN CALCIUM 80 MG/1
1 TABLET, FILM COATED ORAL
Qty: 30 | Refills: 0 | OUTPATIENT
Start: 2019-03-12 | End: 2019-04-10

## 2019-03-12 RX ORDER — SODIUM CHLORIDE 9 MG/ML
500 INJECTION INTRAMUSCULAR; INTRAVENOUS; SUBCUTANEOUS
Qty: 0 | Refills: 0 | Status: COMPLETED | OUTPATIENT
Start: 2019-03-12 | End: 2019-03-12

## 2019-03-12 RX ORDER — PANTOPRAZOLE SODIUM 20 MG/1
40 TABLET, DELAYED RELEASE ORAL
Qty: 0 | Refills: 0 | Status: DISCONTINUED | OUTPATIENT
Start: 2019-03-12 | End: 2019-03-12

## 2019-03-12 RX ORDER — ASPIRIN/CALCIUM CARB/MAGNESIUM 324 MG
81 TABLET ORAL DAILY
Qty: 0 | Refills: 0 | Status: DISCONTINUED | OUTPATIENT
Start: 2019-03-13 | End: 2019-03-12

## 2019-03-12 RX ORDER — CLOPIDOGREL BISULFATE 75 MG/1
450 TABLET, FILM COATED ORAL ONCE
Qty: 0 | Refills: 0 | Status: COMPLETED | OUTPATIENT
Start: 2019-03-12 | End: 2019-03-12

## 2019-03-12 RX ADMIN — SODIUM CHLORIDE 75 MILLILITER(S): 9 INJECTION INTRAMUSCULAR; INTRAVENOUS; SUBCUTANEOUS at 15:35

## 2019-03-12 RX ADMIN — AMLODIPINE BESYLATE 2.5 MILLIGRAM(S): 2.5 TABLET ORAL at 06:48

## 2019-03-12 RX ADMIN — Medication 25 MILLIGRAM(S): at 06:48

## 2019-03-12 RX ADMIN — Medication 81 MILLIGRAM(S): at 06:48

## 2019-03-12 RX ADMIN — CLOPIDOGREL BISULFATE 75 MILLIGRAM(S): 75 TABLET, FILM COATED ORAL at 06:48

## 2019-03-12 RX ADMIN — Medication 1 MILLIGRAM(S): at 06:48

## 2019-03-12 RX ADMIN — Medication 1 TABLET(S): at 06:48

## 2019-03-12 RX ADMIN — Medication 100 MILLIGRAM(S): at 06:49

## 2019-03-12 RX ADMIN — CLOPIDOGREL BISULFATE 450 MILLIGRAM(S): 75 TABLET, FILM COATED ORAL at 11:12

## 2019-03-12 RX ADMIN — Medication 243 MILLIGRAM(S): at 11:11

## 2019-03-12 NOTE — PROGRESS NOTE ADULT - SUBJECTIVE AND OBJECTIVE BOX
Interval HPI/overnight events:    VITAL SIGNS:  Vital Signs Last 24 Hrs  T(C): 36.4 (12 Mar 2019 05:50), Max: 36.9 (11 Mar 2019 09:56)  T(F): 97.6 (12 Mar 2019 05:50), Max: 98.5 (11 Mar 2019 09:56)  HR: 52 (12 Mar 2019 05:50) (52 - 66)  BP: 106/66 (12 Mar 2019 05:50) (106/66 - 135/83)  BP(mean): 78 (12 Mar 2019 05:50) (78 - 94)  RR: 10 (12 Mar 2019 05:50) (10 - 18)  SpO2: 97% (11 Mar 2019 20:18) (97% - 99%)    PHYSICAL EXAM:    General: in NAD, lying comfortably in bed  HEENT: normocephalic, atraumatic; PERRL, anicteric sclera; MMM  Neck: supple, no JVD, no thyromegaly, no lymphadenopathy  Cardiovascular: +S1/S2, RRR, no M/G/R  Respiratory: clear to auscultation B/L; no wheezing, no rales, no rhonchi  Gastrointestinal: soft, NT/ND; +BSx4, no organomegaly  Extremities: WWP; no edema, clubbing or cyanosis  Vascular: 2+ radial, DP/PT pulses B/L  Neurological: AAOx3; no focal deficits    MEDICATIONS:  MEDICATIONS  (STANDING):  amLODIPine   Tablet 2.5 milliGRAM(s) Oral daily  aspirin  chewable 81 milliGRAM(s) Oral daily  atorvastatin 80 milliGRAM(s) Oral at bedtime  clopidogrel Tablet 75 milliGRAM(s) Oral daily  folic acid 1 milliGRAM(s) Oral daily  heparin  Injectable 5000 Unit(s) SubCutaneous every 8 hours  lamoTRIgine 150 milliGRAM(s) Oral daily  melatonin 5 milliGRAM(s) Oral at bedtime  metoprolol succinate ER 25 milliGRAM(s) Oral daily  multivitamin 1 Tablet(s) Oral daily  sertraline 150 milliGRAM(s) Oral daily  sodium chloride 0.9%. 1000 milliLiter(s) (70 mL/Hr) IV Continuous <Continuous>  thiamine 100 milliGRAM(s) Oral daily  traZODone 50 milliGRAM(s) Oral at bedtime    MEDICATIONS  (PRN):  LORazepam   Injectable 2 milliGRAM(s) IV Push every 4 hours PRN EtOH withdrawal      ALLERGIES:  Allergies    Allergy Status Unknown    Intolerances        LABS:                        13.3   6.90  )-----------( 175      ( 12 Mar 2019 06:13 )             41.6     03-12    141  |  106  |  16  ----------------------------<  99  3.9   |  26  |  1.08    Ca    9.1      12 Mar 2019 06:13  Mg     2.1     03-12    TPro  5.7<L>  /  Alb  3.7  /  TBili  0.4  /  DBili  x   /  AST  12  /  ALT  14  /  AlkPhos  52  03-12    PT/INR - ( 12 Mar 2019 06:13 )   PT: 11.9 sec;   INR: 1.05          PTT - ( 12 Mar 2019 06:13 )  PTT:27.0 sec    CAPILLARY BLOOD GLUCOSE      POCT Blood Glucose.: 96 mg/dL (10 Mar 2019 23:15)      RADIOLOGY & ADDITIONAL TESTS: Reviewed. Interval HPI/overnight events: No acute events reported overnight. Patient seen and examined at bedside this morning, no acute complaints. Denies chest pain, palpitations, fever, chills, SOB, diaphoresis, tactile disturbances, A/V hallucinations, abdominal pain, nausea/vomiting.    VITAL SIGNS:  Vital Signs Last 24 Hrs  T(C): 36.4 (12 Mar 2019 05:50), Max: 36.9 (11 Mar 2019 09:56)  T(F): 97.6 (12 Mar 2019 05:50), Max: 98.5 (11 Mar 2019 09:56)  HR: 52 (12 Mar 2019 05:50) (52 - 66)  BP: 106/66 (12 Mar 2019 05:50) (106/66 - 135/83)  BP(mean): 78 (12 Mar 2019 05:50) (78 - 94)  RR: 10 (12 Mar 2019 05:50) (10 - 18)  SpO2: 97% (11 Mar 2019 20:18) (97% - 99%)    PHYSICAL EXAM:  General: in NAD, lying comfortably in bed  HEENT: normocephalic, atraumatic; PERRL, anicteric sclera; MMM  Neck: supple, no JVD, no thyromegaly, no lymphadenopathy  Cardiovascular: +S1/S2, RRR, no M/G/R  Respiratory: clear to auscultation B/L; no wheezing, no rales, no rhonchi  Gastrointestinal: soft, NT/ND; +BSx4, no organomegaly  Extremities: WWP; no edema, clubbing or cyanosis  Vascular: 2+ radial, DP/PT pulses B/L  Neurological: AAOx3; CN II-XII grossly intact, no focal deficits    MEDICATIONS:  MEDICATIONS  (STANDING):  amLODIPine   Tablet 2.5 milliGRAM(s) Oral daily  aspirin  chewable 81 milliGRAM(s) Oral daily  atorvastatin 80 milliGRAM(s) Oral at bedtime  clopidogrel Tablet 75 milliGRAM(s) Oral daily  folic acid 1 milliGRAM(s) Oral daily  heparin  Injectable 5000 Unit(s) SubCutaneous every 8 hours  lamoTRIgine 150 milliGRAM(s) Oral daily  melatonin 5 milliGRAM(s) Oral at bedtime  metoprolol succinate ER 25 milliGRAM(s) Oral daily  multivitamin 1 Tablet(s) Oral daily  sertraline 150 milliGRAM(s) Oral daily  sodium chloride 0.9%. 1000 milliLiter(s) (70 mL/Hr) IV Continuous <Continuous>  thiamine 100 milliGRAM(s) Oral daily  traZODone 50 milliGRAM(s) Oral at bedtime    MEDICATIONS  (PRN):  LORazepam   Injectable 2 milliGRAM(s) IV Push every 4 hours PRN EtOH withdrawal      ALLERGIES:  Allergies    Allergy Status Unknown    Intolerances        LABS:                        13.3   6.90  )-----------( 175      ( 12 Mar 2019 06:13 )             41.6     03-12    141  |  106  |  16  ----------------------------<  99  3.9   |  26  |  1.08    Ca    9.1      12 Mar 2019 06:13  Mg     2.1     03-12    TPro  5.7<L>  /  Alb  3.7  /  TBili  0.4  /  DBili  x   /  AST  12  /  ALT  14  /  AlkPhos  52  03-12    PT/INR - ( 12 Mar 2019 06:13 )   PT: 11.9 sec;   INR: 1.05          PTT - ( 12 Mar 2019 06:13 )  PTT:27.0 sec    CAPILLARY BLOOD GLUCOSE      POCT Blood Glucose.: 96 mg/dL (10 Mar 2019 23:15)      RADIOLOGY & ADDITIONAL TESTS: Reviewed.

## 2019-03-12 NOTE — DISCHARGE NOTE NURSING/CASE MANAGEMENT/SOCIAL WORK - NSDCDPATPORTLINK_GEN_ALL_CORE
You can access the [facility] Portal, offered by [facility], by registering with the following website: [email or web address]

## 2019-03-12 NOTE — DISCHARGE NOTE PROVIDER - NSDCCPTREATMENT_GEN_ALL_CORE_FT
PRINCIPAL PROCEDURE  Procedure: CTA coronary arteries  Findings and Treatment: Impression:   1.  The calcium score is severe at 814  Agatston units, which is at the   92nd percentile, adjusted for age, gender and race.  2.  Moderate stenosis in the proximal LAD.  3.  Dense calcific plaque precludes accurate evaluation of D1.   Significant stenosis cannot be excluded in this segment.  4.  Non obstructive coronary disease elsewhere.  5.  The calculated LVEF is 67%, with normal wall motion and wall   thickness.      SECONDARY PROCEDURE  Procedure: Transthoracic echocardiography  Findings and Treatment: Summary  There is mild concentric left ventricular hypertrophy.The left   ventricular wall motion is normal.The left ventricular ejection fraction is 62%.The left atrial size is normal.Right atrial size is normal.The right ventricle is normal in size and function.Structurally normal aortic valve.No aortic regurgitation noted.Structurally normal mitral valve.There is mild mitral regurgitation.Structurally normal tricuspid valve.There is trace to mild tricuspid regurgitation.There is no echocardiographic evidence for pulmonary hypertension.Structurally normal pulmonic valve.No aortic root dilatation.There is no pericardial effusion.Contrast injection with Lumason was performed.

## 2019-03-12 NOTE — DISCHARGE NOTE PROVIDER - NSDCCPCAREPLAN_GEN_ALL_CORE_FT
PRINCIPAL DISCHARGE DIAGNOSIS  Problem: CAD (coronary artery disease)  Assessment and Plan of Treatment: You presented to the hospital after passing out at a restaurant, CAT scan of your heart vessels showed some narrowing in two of your vessels, you went for a cardiac catheterization which didn't show any obstruction. Please continue taking Aspirin 81mg daily, Lipitor 40mg daily, Toprol 25mg daily. Please follow up with your primary care doctor, and cardiology (Dr. Veliz)      SECONDARY DISCHARGE DIAGNOSES  Problem: Alcohol use  Assessment and Plan of Treatment: You report drinking vodka every night. Please refrain from drinking more than 2 drinks per day, you can call [facility]'s 24-hour-a-day, seven-day-a-week hotline at 8[facility] (1-989.775.2758) for help with resources to help quitting or reduce drinking    Problem: Syncope  Assessment and Plan of Treatment: You presented with an episode of passing out at the restaurant. Your cardiac workup showed some narrowing in your vessels, but no obstruction. Please follow up with your primary care doctor for furhter workup    Problem: Hypertension  Assessment and Plan of Treatment: Please continue taking Toprol 25mg daily, and your home Norvasc-Benazepril 2.5-10mg daily    Problem: HLD (hyperlipidemia)  Assessment and Plan of Treatment: Please continue taking Lipitor 40mg daily    Problem: Depression  Assessment and Plan of Treatment: Please continue taking Lamictal 150mg, Zoloft 150mg daily, and Trazodone 50mg at bedtime. Please follow up with your primary care provider

## 2019-03-12 NOTE — PROGRESS NOTE ADULT - ATTENDING COMMENTS
See resident note written above, for details. I reviewed the resident documentation.  I reviewed vitals, labs, medications, cardiac studies and additional imaging 3/12/19.  I agree with the resident's findings and plans as written above with the following additions/amendments:    63 WM with HLD, Depression, EtOH abuse presents as transfer from [facility] for evaluation of abnormal EKG. CCTA with mdoerate LAD stenosis.  Patient transferred to [facility] for further management and [facility] for ischemic evaluation. Patient HDS, asx, and eager to be discharged. [facility] with non obstructive CAD, Normal LV function, EF 55%, LVEDP 18. Recommended for medical management of non obstructive CAD  Plan for:  ASA 81 daily  Atorva 40mg qHS  Toprol 25 XL daily  Patient to discharge to home with PCP and Cardiology follow up  ZOHREH Bravo.  Cardiology Attending

## 2019-03-12 NOTE — PROGRESS NOTE ADULT - NSICDXPROBLEM_GEN_ALL_CORE_FT
PROBLEM DIAGNOSES  Problem: CAD (coronary artery disease)  Assessment and Plan: Patient presenting to [facility] s/p syncope at home, preceded by lightheadedness and nausea in the setting of EtOH and marijuana. Patient denies LOC, head trauma, reports similar symptoms about 2 days ago, and negative stress test 1.5yrs ago. EKG with TWI in V4-V5, repeat EKG with progression of TWI in V1-V6. Troponins –ve x 3. Patient with CCTA showing moderate pLAD stenosis, dense calcific plaque of D1-c/w ASA 81mg qd  -c/w Plavix 75mg qd  -c/w Lipitor 80mg  -c/w Toprol 25mg qd  -f/u EKG  -f/u ECHO  -f/a A1c, TSH, lipid panel  -pt for cardiac cath today  -active T&S    Problem: Alcohol use  Assessment and Plan: Patient reports drinking a couple of drinks on 3/10 evening. BAL of 47. CIWA 0 on admission.  -CIWA 0 this am  -c/w CIWA monitoring  -Folic acid, thiamine, MV  -c/w Ativan 2mg IV PRN    Problem: Syncope  Assessment and Plan: Patient with lightheadedness and nausea, associated with transient unresponsiveness yesterday (daughter describes as  diaphoresis and battling his eyes for about 1 minute). Patient denies any chest pain, palpitations. Orthostatics negative. Patient with CTH. Syncope could be in the setting of CAD vs seizure given presenting symptoms  -f/u ECHO  -will consider seizure w/up after CAD w/up  -management as above    Problem: Hypertension  Assessment and Plan: Patient with a hx of HTN, on home Norvasc-Benazepril 2.5-10mg  -c/w Norvasc 2.5mg daily  -holding Benazepril 10mg in the setting of cardiac cath today  -c/w Toprol 25mg qd  #Transient Hypotension  Patient with an episode of HypoTN in the field (BP 77/40), received 1L NS bolus. Currently normotensive  -c/w meds as above  -hold for SBP <90, HR <60.    Problem: HLD (hyperlipidemia)  Assessment and Plan: -c/w Lipitor 80mg qhs    Problem: Depression  Assessment and Plan:  Patient w/ a hx of Depression/anxiety, on Zoloft 150mg, Lamictal 150mg and Trazodone 50mg at home  -c/w home meds    Problem: Nutrition, metabolism, and development symptoms  Assessment and Plan: F: 70cc/hr NS maintenance  E: replete PRN  N: NPO pending cath, then DASH/TLC    Problem: Prophylactic measure  Assessment and Plan: DVT ppx: Hep SQ 5000u Q8h  GI ppx: none  Dispo: 5lach  Code: FULL    Problem: Transition of care performed with sharing of clinical summary  Assessment and Plan: 1) PCP Contacted on Admission: (Y/N) --> Name & Phone #:  2) Date of Contact with PCP:  3) PCP Contacted at Discharge: (Y/N)  4) Summary of Handoff Given to PCP:   5) Post-Discharge Appointment Date and Location: PROBLEM DIAGNOSES  Problem: CAD (coronary artery disease)  Assessment and Plan: Patient presenting to [facility] s/p syncope at home, preceded by lightheadedness and nausea in the setting of EtOH and marijuana. Patient denies LOC, head trauma, reports similar symptoms about 2 days ago, and negative stress test 1.5yrs ago. EKG with TWI in V4-V5, repeat EKG with progression of TWI in V1-V6. Troponins –ve x 3. Patient with CCTA showing moderate pLAD stenosis, dense calcific plaque of D1-c/w ASA 81mg qd  -c/w Plavix 75mg qd  -c/w Lipitor 80mg  -c/w Toprol 25mg qd  -f/u EKG  -f/u ECHO  -A1c 5.4  -f/u TSH, lipid panel  -pt for cardiac cath today  -active T&S    Problem: Alcohol use  Assessment and Plan: Patient reports drinking a couple of drinks on 3/10 evening. BAL of 47. CIWA 0 on admission.  -CIWA 0 this am  -c/w CIWA monitoring  -Folic acid, thiamine, MV  -c/w Ativan 2mg IV PRN    Problem: Syncope  Assessment and Plan: Patient with lightheadedness and nausea, associated with transient unresponsiveness yesterday (daughter describes as  diaphoresis and battling his eyes for about 1 minute). Patient denies any chest pain, palpitations. Orthostatics negative. Patient with CTH. Syncope could be in the setting of CAD vs seizure given presenting symptoms  -f/u ECHO  -will consider seizure w/up after CAD w/up  -management as above    Problem: Hypertension  Assessment and Plan: Patient with a hx of HTN, on home Norvasc-Benazepril 2.5-10mg  -c/w Norvasc 2.5mg daily  -holding Benazepril 10mg in the setting of cardiac cath today  -c/w Toprol 25mg qd  #Transient Hypotension  Patient with an episode of HypoTN in the field (BP 77/40), received 1L NS bolus. Currently normotensive  -c/w meds as above  -hold for SBP <90, HR <60.    Problem: HLD (hyperlipidemia)  Assessment and Plan: -c/w Lipitor 80mg qhs    Problem: Depression  Assessment and Plan:  Patient w/ a hx of Depression/anxiety, on Zoloft 150mg, Lamictal 150mg and Trazodone 50mg at home  -c/w home meds    Problem: Nutrition, metabolism, and development symptoms  Assessment and Plan: F: 70cc/hr NS maintenance  E: replete PRN  N: NPO pending cath, then DASH/TLC    Problem: Prophylactic measure  Assessment and Plan: DVT ppx: Hep SQ 5000u Q8h  GI ppx: none  Dispo: 5lach  Code: FULL    Problem: Transition of care performed with sharing of clinical summary  Assessment and Plan: 1) PCP Contacted on Admission: (Y/N) --> Name & Phone #:  2) Date of Contact with PCP:  3) PCP Contacted at Discharge: (Y/N)  4) Summary of Handoff Given to PCP:   5) Post-Discharge Appointment Date and Location: PROBLEM DIAGNOSES  Problem: CAD (coronary artery disease)  Assessment and Plan: Patient presenting to [facility] s/p syncope at home, preceded by lightheadedness and nausea in the setting of EtOH and marijuana. Patient denies LOC, head trauma, reports similar symptoms about 2 days ago, and negative stress test 1.5yrs ago. EKG with TWI in V4-V5, repeat EKG with progression of TWI in V1-V6. Troponins –ve x 3. Patient with CCTA showing moderate pLAD stenosis, dense calcific plaque of D1-c/w ASA 81mg qd  -c/w Plavix 75mg qd  -c/w Lipitor 80mg  -c/w Toprol 25mg qd  -f/u EKG  -f/u ECHO  -A1c 5.4, TSH wnl, lipid panel wnl  -pt for cardiac cath today  -active T&S    Problem: Alcohol use  Assessment and Plan: Patient reports drinking a couple of drinks on 3/10 evening. BAL of 47. CIWA 0 on admission.  -CIWA 0 this am  -c/w CIWA monitoring  -Folic acid, thiamine, MV  -c/w Ativan 2mg IV PRN    Problem: Syncope  Assessment and Plan: Patient with lightheadedness and nausea, associated with transient unresponsiveness yesterday (daughter describes as  diaphoresis and battling his eyes for about 1 minute). Patient denies any chest pain, palpitations. Orthostatics negative. Patient with CTH. Syncope could be in the setting of CAD vs seizure given presenting symptoms  -f/u ECHO  -will consider seizure w/up after CAD w/up  -management as above    Problem: Hypertension  Assessment and Plan: Patient with a hx of HTN, on home Norvasc-Benazepril 2.5-10mg  -c/w Norvasc 2.5mg daily  -holding Benazepril 10mg in the setting of cardiac cath today  -c/w Toprol 25mg qd  #Transient Hypotension  Patient with an episode of HypoTN in the field (BP 77/40), received 1L NS bolus. Currently normotensive  -c/w meds as above  -hold for SBP <90, HR <60.    Problem: HLD (hyperlipidemia)  Assessment and Plan: -c/w Lipitor 80mg qhs    Problem: Depression  Assessment and Plan:  Patient w/ a hx of Depression/anxiety, on Zoloft 150mg, Lamictal 150mg and Trazodone 50mg at home  -c/w home meds    Problem: Nutrition, metabolism, and development symptoms  Assessment and Plan: F: 70cc/hr NS maintenance  E: replete PRN  N: NPO pending cath, then DASH/TLC    Problem: Prophylactic measure  Assessment and Plan: DVT ppx: Hep SQ 5000u Q8h  GI ppx: none  Dispo: 5lach  Code: FULL    Problem: Transition of care performed with sharing of clinical summary  Assessment and Plan: 1) PCP Contacted on Admission: (Y/N) --> Name & Phone #:  2) Date of Contact with PCP:  3) PCP Contacted at Discharge: (Y/N)  4) Summary of Handoff Given to PCP:   5) Post-Discharge Appointment Date and Location:

## 2019-03-12 NOTE — DISCHARGE NOTE PROVIDER - HOSPITAL COURSE
64 yo M with a PMH of HLD, depression, EtOH use (drinks about 2-3 doubles of vodka x 10 yrs, no hx of withdrawals, no seizures) presented to [facility] on 3/10 with dizziness and nausea s/p drinking EtOH and using marijuana. As per patient, he had dinner with family at an [employer] restaurant, with a few EtOH drinks, and multiple hits of marijuana, he then started feeling lightheaded associated with nausea, and ?syncope. According to the daughter, patient had an episode of unresponsiveness, with diaphoresis and battling his eyes for about 1 minute, then he put his head on the table, appeared drowsy upon gaining consciousness. Patient however does not recall this episode. Of note, patient with similar symptoms about 2 days ago, however didn’t seek medical care; he had a negative stress test 1.5yrs ago. Upon EMS arrival, he was hypotensive (BP 77/40, received 1L NS bolus). Upon ED arrival, /73, HR 78, RR 18, SpO2 100% on RA, T 97.8.  Labs s/f lactate 2.5, Trop I –ve x 3. BAL 47, Utox -ve. EKG c/w TWI in V4, V5. Repeat EKG with progression of TWI in V3-V6, patient given Lopressor 25mg x 1 and NTG 0.5mg for CCTA. CCTA showed moderate pLAD stenosis, dense calcific plaque of D1, calcium score 814, EF 67% with normal wall motion. He was given 1L NS bolus, 1L D5 ½ NS bolus, Patient transferred to [facility] telemetry unit, he went for a cardiac catheterization which showed non-obstructive CAD. Patient is stable for discharge home with follow up with PMD and cardiologist. 64 yo M with a PMH of HLD, depression, EtOH use (drinks about 2-3 doubles of vodka x 10 yrs, no hx of withdrawals, no seizures) presented to [facility] on 3/10 with dizziness and nausea s/p drinking EtOH and using marijuana. As per patient, he had dinner with family at an [employer] restaurant, with a few EtOH drinks, and multiple hits of marijuana, he then started feeling lightheaded associated with nausea, and ?syncope. According to the daughter, patient had an episode of unresponsiveness, with diaphoresis and battling his eyes for about 1 minute, then he put his head on the table, appeared drowsy upon gaining consciousness. Patient however does not recall this episode. Of note, patient with similar symptoms about 2 days ago, however didn’t seek medical care; he had a negative stress test 1.5yrs ago. Upon EMS arrival, he was hypotensive (BP 77/40, received 1L NS bolus). Upon ED arrival, /73, HR 78, RR 18, SpO2 100% on RA, T 97.8.  Labs s/f lactate 2.5, Trop I –ve x 3. BAL 47, Utox -ve. EKG c/w TWI in V4, V5. Repeat EKG with progression of TWI in V3-V6, patient given Lopressor 25mg x 1 and NTG 0.5mg for CCTA. CCTA showed moderate pLAD stenosis, dense calcific plaque of D1, calcium score 814, EF 67% with normal wall motion. He was given 1L NS bolus, 1L D5 ½ NS bolus, Patient transferred to [facility] telemetry unit, he went for a cardiac catheterization which showed non-obstructive CAD. Patient is stable for discharge home with follow up with PMD and cardiologist.

## 2019-03-12 NOTE — CHART NOTE - NSCHARTNOTEFT_GEN_A_CORE
Interventional Cardiology PA Precath Note    HPI: 64 y/o M ETOH Abuser (2-3 shots of Vodka/daily – last drink 3/11/19), Marijuana User (last use 3/11/19) w/ PMHX HTN, HLD, Anxiety/Depression, who presented to [facility] s/p syncope at dinner table after alcohol intake / marijuana use, no head trauma reported. Pt reported dizziness, diaphoresis, and nausea. LOC for 1 minute per daughter. Pt also reports SNOW on climbing 1 flight of stairs. EMS noted pt hypotensive in the field (BP 77/40, received 1L NS bolus). At [facility] /73, HR 78, RR 18, O2 100% RA. Lactic Acidosis 2.5 noted in setting of hypotension. Repeat Lactic Acid normal. EKG showed NSR @ 68bpm, TWI in V2-V6, I, II, AVL, ST depression in V5-V6, II. Transferred to [facility]. Troponin negative x 3.  Cardiac CTA showed calcium score 814, moderate stenosis in the proximal LAD. Dense calcific plaque precludes accurate evaluation of D1. EF normal. VSS at present. CIWA 0. Pt denies CP, LE edema, palpitations, orthopnea, PND. Denies fever/chills, productive cough, abdominal pain, diarrhea,     Due to pts risk factors, syncope, unstable angina equivalent, abnormal cardiac CTA, pt is referred for cardiac catheterization w/ possible intervention.   PMH: As above  PSH:  Denies  ALL: NKDA, Denies shellfish/Contrast dye allergy.  Social HX: Denies TOB  ETOH: 2-3 shots of Vodka/daily  Drug: Marijuana User  FHx: Denies FMHX CAD/MI    MEDS:   amLODIPine   Tablet 2.5 milliGRAM(s) Oral daily  aspirin  chewable 81 milliGRAM(s) Oral daily  atorvastatin 80 milliGRAM(s) Oral at bedtime  clopidogrel Tablet 75 milliGRAM(s) Oral daily  folic acid 1 milliGRAM(s) Oral daily  heparin  Injectable 5000 Unit(s) SubCutaneous every 8 hours  lamoTRIgine 150 milliGRAM(s) Oral daily  LORazepam   Injectable 2 milliGRAM(s) IV Push every 4 hours PRN  melatonin 5 milliGRAM(s) Oral at bedtime  metoprolol succinate ER 25 milliGRAM(s) Oral daily  multivitamin 1 Tablet(s) Oral daily  sertraline 150 milliGRAM(s) Oral daily  sodium chloride 0.9%. 1000 milliLiter(s) IV Continuous <Continuous>  thiamine 100 milliGRAM(s) Oral daily  traZODone 50 milliGRAM(s) Oral at bedtime    T(C): 35.7 (03-12-19 @ 09:07), Max: 36.9 (03-11-19 @ 13:22)  HR: 50 (03-12-19 @ 09:18) (50 - 66)  BP: 128/69 (03-12-19 @ 09:18) (106/66 - 135/83)  RR: 16 (03-12-19 @ 09:18) (10 - 18)  SpO2: 97% (03-12-19 @ 09:18) (97% - 99%)                                  13.3   6.90  )-----------( 175      ( 12 Mar 2019 06:13 )             41.6     03-12    141  |  106  |  16  ----------------------------<  99  3.9   |  26  |  1.08    Ca    9.1      12 Mar 2019 06:13  Mg     2.1     03-12    TPro  5.7<L>  /  Alb  3.7  /  TBili  0.4  /  DBili  x   /  AST  12  /  ALT  14  /  AlkPhos  52  03-12    CARDIAC MARKERS ( 11 Mar 2019 07:40 )  0.041 ng/mL / x     / x     / x     / x      CARDIAC MARKERS ( 11 Mar 2019 03:26 )  0.034 ng/mL / x     / x     / x     / x      CARDIAC MARKERS ( 10 Mar 2019 23:20 )  0.031 ng/mL / x     / x     / x     / x          A/P:  64 y/o M ETOH Abuser, Marijuana User w/ PMHX HTN, HLD, Anxiety/Depression, w/ syncope, unstable angina equivalent, abnormal cardiac CTA.     Due to pts risk factors, syncope, unstable angina equivalent, abnormal cardiac CTA, pt is referred for cardiac catheterization w/ possible intervention.   Started on Plavix 75mg yesterday  Total 150mg Plavix received. Additional Plavix 450mg this AM.   H/H stable. Denies h/o Bleeding.  ASA 81mg taken this AM. Additional ASA 243mg pre-cath.  IV NS @ 70cc/hr pre-cath   VSS. CIWA 0.  Close monitoring  Case discussed w/ Dr. Chavira       Sedation Plan:   Moderate      Patient Is Suitable Candidate for Sedation   Yes       Risks & benefits of procedure and sedation and risks and benefits for the alternative therapy have been explained to the patient in layman’s terms including but not limited to: allergic reaction, bleeding, infection, arrhythmia, respiratory compromise, renal and vascular compromise, limb damage, MI, CVA, emergent CABG/Vascular Surgery and death. Informed consent obtained and in chart.

## 2019-03-12 NOTE — DISCHARGE NOTE PROVIDER - NSDCFUADDAPPT_GEN_ALL_CORE_FT
Please follow up with Dr. Veliz (cardiology) on March 26th at 9.15am    Please follow up with your primary care doctor within 1-2 weeks

## 2019-03-12 NOTE — DISCHARGE NOTE PROVIDER - CARE PROVIDER_API CALL
Ashely Veliz)  Cardiology; Internal Medicine  [address] [place] [address]  Phone: [phone]  Fax: [phone]  Follow Up Time:

## 2019-03-12 NOTE — PROGRESS NOTE ADULT - ASSESSMENT
64 yo M with a PMH of HLD, depression, EtOH use (drinks about 2-3 doubles of vodka x 10 yrs, no hx of withdrawals, no seizures) presented to [facility] on 3/10 with dizziness and nausea s/p drinking EtOH and using marijuana. EKG c/w TWI in V4, V5. Repeat EKG with progression of TWI in V3-V6. CCTA showed moderate pLAD stenosis, dense calcific plaque of D1, calcium score 814, EF 67% with normal wall motion. Patient transferred to [facility] for further management

## 2019-03-12 NOTE — PROGRESS NOTE ADULT - SUBJECTIVE AND OBJECTIVE BOX
Procedure: LHC, Vascade  Indication: UA, CAD  Complication: none    Result:  1) Non-obstructive CAD  2) Normal LV function, EF 55%, LVEDP 18        Plan: Medical management.

## 2019-03-15 DIAGNOSIS — F32.9 MAJOR DEPRESSIVE DISORDER, SINGLE EPISODE, UNSPECIFIED: ICD-10-CM

## 2019-03-15 DIAGNOSIS — R55 SYNCOPE AND COLLAPSE: ICD-10-CM

## 2019-03-15 DIAGNOSIS — Z72.89 OTHER PROBLEMS RELATED TO LIFESTYLE: ICD-10-CM

## 2019-03-15 DIAGNOSIS — I10 ESSENTIAL (PRIMARY) HYPERTENSION: ICD-10-CM

## 2019-03-15 DIAGNOSIS — I25.10 ATHEROSCLEROTIC HEART DISEASE OF NATIVE CORONARY ARTERY WITHOUT ANGINA PECTORIS: ICD-10-CM

## 2019-03-15 DIAGNOSIS — E78.5 HYPERLIPIDEMIA, UNSPECIFIED: ICD-10-CM

## 2019-03-15 DIAGNOSIS — F12.90 CANNABIS USE, UNSPECIFIED, UNCOMPLICATED: ICD-10-CM

## 2019-03-26 ENCOUNTER — APPOINTMENT (OUTPATIENT)
Dept: HEART AND VASCULAR | Facility: CLINIC | Age: 64
End: 2019-03-26

## 2019-06-11 NOTE — ED CDU PROVIDER DISPOSITION NOTE - NS ED MD DISPO DIVISION
1.  You were seen in the ENT Clinic today by Dr. Purdy.  If you have any questions or concerns after your appointment, please call [phone].    2.  Plan is to return to clinic in 1 month with no audiogram.     Thank you!  Mary Maddox RN Care Coordinator  [facility]     [facility]

## 2023-03-01 ENCOUNTER — APPOINTMENT (OUTPATIENT)
Dept: ORTHOPEDIC SURGERY | Facility: CLINIC | Age: 68
End: 2023-03-01
Payer: MEDICARE

## 2023-03-01 PROCEDURE — 99204 OFFICE O/P NEW MOD 45 MIN: CPT

## 2023-03-06 NOTE — HISTORY OF PRESENT ILLNESS
[de-identified] : Mr. Doe is a 67-year-old man complaining of dominant left shoulder pain for 6 months.  He is an avid  and over the last 5 years had intermittent left shoulder pain after playing tennis that interrupted sleep at night but did not compromise activities of daily living.  In the last 6 months he has noted increasing pain and stiffness in the left shoulder without a specific injury.  He has pain at night that interrupts his sleep and difficulty performing activities of daily living requiring overhead use.  In November 2022 he had a cortisone injection in the left shoulder which gave him very good relief of pain and he was able to return to tennis playing.\par \par In December 2022 he injured the left shoulder while running for a tetanus shot and pulled the left shoulder in abduction.  He had a dramatic increase in his pain at that time that has persisted over the last 2 months and has precluded his ability to return to playing tennis.  He continues to have pain at night that interrupts his sleep and difficulty performing activities of daily living requiring overhead use.  He is unable to perform any of the routine strengthening exercises using the left arm because of persistent pain.\par \par He currently takes Advil which gives some relief of pain and works as a  and .  His principal form of exercise is tennis playing and he does not lift weights on a regular basis.

## 2023-03-06 NOTE — PHYSICAL EXAM
[de-identified] : CONSTITUTIONAL: Patient is well-developed, well-nourished and appropriately groomed.\par SKIN: There are no rashes, no ulcers, and no café au lait spots in the upper extremities, face or cervical region.\par CARDIOVASCULAR: Both distal upper extremities are warm and the fingers have good capillary refill. Normal radial pulses bilaterally.\par RESPIRATORY: The patient is breathing normally and in no acute distress. \par HEMATOLOGICAL/LYMPHATIC: There is no lymphedema in either upper extremity. No cervical adenopathy, no axillary adenopathy.\par PSYCH: The patient is alert and oriented x 3;  appropriately groomed and dressed.\par NEUROLOGIC: Normal gait and station.\par MUSCULOSKELETAL:\par \par The left shoulder has 150 degrees passive forward elevation, 70 degrees external rotation, and internal rotation to the 12 thoracic vertebra.  He has no crepitus with passive rotation of the glenohumeral joint but he does have pain at the extremes of motion.  He can fully raise the arm but this is painful.  Strength of external rotation is normal and he has subacromial tenderness but no crepitus, a positive arc of pain, and a positive impingement sign but no tenderness over the acromioclavicular joint.\par \par The contralateral shoulder has full active and passive range of motion with 170° elevation, 70° external rotation, and internal rotation to the seventh thoracic vertebra without pain. There is no focal tenderness and no crepitus with passive rotation of the glenohumeral joint.\par \par \par The cervical spine has full passive and active range of motion without pain. There is no focal tenderness in the paracervical muscles nor in the trapezius or parascapular muscles. Distally the motor and sensory exam is normal in both upper extremities.\par \par Distally, the hands are warm and well perfused with no signs of lymphedema or swelling. The radial pulse is present and normal.\par

## 2023-03-06 NOTE — DISCUSSION/SUMMARY
[de-identified] : I reviewed an MRI of the left shoulder performed on 12- that shows a well-preserved glenohumeral joint space and partial tearing of the rotator cuff.\par \par Using a plastic shoulder model, I reviewed the anatomy of the rotator cuff muscles and the glenohumeral joint. I then explained the pathophysiology of impingement syndrome, commonly referred to as bursitis of the shoulder, and the fact that 80% of patients respond to nonoperative treatment.\par \par I then reviewed the nonoperative treatment program which includes activity modification to avoid those activities that exacerbate shoulder pain, Tylenol or anti-inflammatory medications as necessary, rehabilitation exercises focusing on shoulder stretching and rotator cuff muscle strengthening, either independently or with a physical therapist, and, a subacromial cortisone injection for those patients who remain symptomatic following an exercise program. \par \par Finally, I explained that those patients who remain symptomatic after a minimum of 3-4 months of nonoperative treatment and continue to have shoulder pain at night that interrupts sleep and compromises activities of everyday life or sporting activities requiring overhead use are candidates for surgical treatment.\par \par I then gave instructions in a home exercise program for the rotator cuff muscles emphasizing stretching and strengthening of the shoulder and also gave my home exercise sheet which includes illustrations of the exercises.  I also reviewed a closed chain strengthening exercise program to be done in the gym independently below shoulder level and at an intensity that does not cause pain.\par \par Using a plastic shoulder model I explained the anatomy of the glenohumeral joint and surrounding muscles. I explained the pathophysiology of frozen shoulder which is inflammation and contracture of the glenohumeral ligaments, and the fact that 90% of patients are cured with non-operative treatment. The treatment focuses on a home stretching exercise program performed for brief periods of time but frequently during the day and may require 3-4 months before full motion is regained. Once motion is regained, pain resolves.\par \par I reviewed a home program focusing on shoulder stretching and rotator cuff strengthening and gave the patient my home exercise sheet. I advised that exercises in the gym may be performed provided these are closed chain exercises below shoulder level that do not exacerbate pain.\par \par To the extent that it is possible, routine activities of daily living that exacerbate pain should be minimized, and I recommended that an anti-inflammatory and/or Tylenol should be taken as necessary for pain relief.\par \par I should reevaluate in 6 weeks to assess progress at which time I would expect improved motion but not necessarily full motion.  If he regains full motion of the shoulder and continues to have pain, I would recommend another cortisone injection.  He should continue to take Advil as necessary for pain relief.  He may also begin to return to playing tennis but just doing groundstrokes as long as these do not cause pain.\par \par Today his clinical left shoulder [employer] score is 50 on a scale of 100 indicating marked compromise of shoulder function.\par \par

## 2023-03-06 NOTE — CONSULT LETTER
[Dear  ___] : Dear  [unfilled], [FreeTextEntry2] : T: [phone]\par F: [phone] [FreeTextEntry1] : Today I had the pleasure of evaluating your very nice patient LES MARTINEZ  who requested that I share my findings with you. I very much appreciate the referral. \par \par Please review my office note below and, needless to say, please call or email me with any questions or concerns.\par \par I appreciate the opportunity to participate in his care.\par \par Sincerely,\par \par Rudy Perales MD\par Director, Orthopaedic Surgery\par and Orthopaedic [employer] \par [facility]\par Office: [phone]\par Cell: [phone]\par Email: [email or web address] Website: [email or web address] \par \par \par \par \par

## 2023-04-13 ENCOUNTER — APPOINTMENT (OUTPATIENT)
Dept: ORTHOPEDIC SURGERY | Facility: CLINIC | Age: 68
End: 2023-04-13
Payer: MEDICARE

## 2023-04-13 VITALS — HEIGHT: 72 IN | BODY MASS INDEX: 23.7 KG/M2 | RESPIRATION RATE: 16 BRPM | WEIGHT: 175 LBS

## 2023-04-13 DIAGNOSIS — M75.42 IMPINGEMENT SYNDROME OF LEFT SHOULDER: ICD-10-CM

## 2023-04-13 DIAGNOSIS — M75.02 ADHESIVE CAPSULITIS OF LEFT SHOULDER: ICD-10-CM

## 2023-04-13 PROCEDURE — 99212 OFFICE O/P EST SF 10 MIN: CPT

## 2023-07-28 DIAGNOSIS — M25.551 PAIN IN RIGHT HIP: ICD-10-CM

## 2023-07-31 ENCOUNTER — APPOINTMENT (OUTPATIENT)
Dept: ORTHOPEDIC SURGERY | Facility: CLINIC | Age: 68
End: 2023-07-31
Payer: MEDICARE

## 2023-07-31 DIAGNOSIS — Z78.9 OTHER SPECIFIED HEALTH STATUS: ICD-10-CM

## 2023-07-31 DIAGNOSIS — M76.11 PSOAS TENDINITIS, RIGHT HIP: ICD-10-CM

## 2023-07-31 DIAGNOSIS — C80.1 MALIGNANT (PRIMARY) NEOPLASM, UNSPECIFIED: ICD-10-CM

## 2023-07-31 PROCEDURE — 72190 X-RAY EXAM OF PELVIS: CPT

## 2023-07-31 RX ORDER — LOSARTAN POTASSIUM 100 MG/1
100 TABLET, FILM COATED ORAL
Qty: 90 | Refills: 0 | Status: ACTIVE | COMMUNITY
Start: 2022-09-23

## 2023-07-31 RX ORDER — TRAZODONE HYDROCHLORIDE 50 MG/1
50 TABLET ORAL
Qty: 90 | Refills: 0 | Status: ACTIVE | COMMUNITY
Start: 2023-06-26

## 2023-07-31 RX ORDER — LAMOTRIGINE 150 MG/1
150 TABLET ORAL
Qty: 90 | Refills: 0 | Status: ACTIVE | COMMUNITY
Start: 2023-07-10

## 2023-07-31 RX ORDER — ATORVASTATIN CALCIUM 40 MG/1
40 TABLET, FILM COATED ORAL
Qty: 90 | Refills: 0 | Status: ACTIVE | COMMUNITY
Start: 2023-06-10

## 2023-07-31 RX ORDER — FLUOROURACIL 50 MG/G
5 CREAM TOPICAL
Qty: 40 | Refills: 0 | Status: ACTIVE | COMMUNITY
Start: 2023-07-06

## 2023-07-31 RX ORDER — SERTRALINE HYDROCHLORIDE 100 MG/1
100 TABLET, FILM COATED ORAL
Qty: 90 | Refills: 0 | Status: ACTIVE | COMMUNITY
Start: 2023-05-15